# Patient Record
Sex: FEMALE | Race: ASIAN | NOT HISPANIC OR LATINO | Employment: UNEMPLOYED | URBAN - METROPOLITAN AREA
[De-identification: names, ages, dates, MRNs, and addresses within clinical notes are randomized per-mention and may not be internally consistent; named-entity substitution may affect disease eponyms.]

---

## 2022-05-03 ENCOUNTER — TELEPHONE (OUTPATIENT)
Dept: OBGYN | Age: 37
End: 2022-05-03

## 2022-05-04 ENCOUNTER — APPOINTMENT (OUTPATIENT)
Dept: OBGYN | Age: 37
End: 2022-05-04

## 2022-05-09 ENCOUNTER — TELEPHONIC VISIT (OUTPATIENT)
Dept: OBGYN | Age: 37
End: 2022-05-09

## 2022-05-09 DIAGNOSIS — Z34.90 PREGNANCY, UNSPECIFIED GESTATIONAL AGE: Primary | ICD-10-CM

## 2022-05-09 PROCEDURE — X1094 NO CHARGE VISIT: HCPCS | Performed by: OBSTETRICS & GYNECOLOGY

## 2022-05-19 ENCOUNTER — LAB SERVICES (OUTPATIENT)
Dept: LAB | Age: 37
End: 2022-05-19

## 2022-05-19 ENCOUNTER — FIRST OB VISIT (OUTPATIENT)
Dept: OBGYN | Age: 37
End: 2022-05-19

## 2022-05-19 VITALS
TEMPERATURE: 99.3 F | HEIGHT: 65 IN | RESPIRATION RATE: 14 BRPM | BODY MASS INDEX: 24.56 KG/M2 | WEIGHT: 147.4 LBS | DIASTOLIC BLOOD PRESSURE: 66 MMHG | HEART RATE: 108 BPM | SYSTOLIC BLOOD PRESSURE: 95 MMHG

## 2022-05-19 DIAGNOSIS — Z34.90 PREGNANCY, UNSPECIFIED GESTATIONAL AGE: Primary | ICD-10-CM

## 2022-05-19 DIAGNOSIS — Z34.90 PREGNANCY, UNSPECIFIED GESTATIONAL AGE: ICD-10-CM

## 2022-05-19 DIAGNOSIS — O09.529 ANTEPARTUM MULTIGRAVIDA OF ADVANCED MATERNAL AGE: ICD-10-CM

## 2022-05-19 PROBLEM — O09.511 PRIMIGRAVIDA OF ADVANCED MATERNAL AGE IN FIRST TRIMESTER: Status: ACTIVE | Noted: 2022-05-19

## 2022-05-19 LAB
ABO + RH BLD: NORMAL
APPEARANCE UR: CLEAR
APPEARANCE, POC: CLEAR
B-HCG UR QL: POSITIVE
BASOPHILS # BLD: 0 K/MCL (ref 0–0.3)
BASOPHILS NFR BLD: 0 %
BILIRUB UR QL STRIP: NEGATIVE
BILIRUBIN, POC: NEGATIVE
BLD GP AB SCN SERPL QL GEL: NEGATIVE
COLOR UR: ABNORMAL
COLOR, POC: YELLOW
DEPRECATED RDW RBC: 41 FL (ref 39–50)
EOSINOPHIL # BLD: 0 K/MCL (ref 0–0.5)
EOSINOPHIL NFR BLD: 0 %
ERYTHROCYTE [DISTWIDTH] IN BLOOD: 12.1 % (ref 11–15)
GLUCOSE UR STRIP-MCNC: NEGATIVE MG/DL
GLUCOSE UR-MCNC: NEGATIVE MG/DL
HBV SURFACE AG SER QL: NEGATIVE
HCT VFR BLD CALC: 38.8 % (ref 36–46.5)
HCV AB SER QL: NEGATIVE
HGB BLD-MCNC: 13.3 G/DL (ref 12–15.5)
HGB UR QL STRIP: NEGATIVE
HIV 1+2 AB+HIV1 P24 AG SERPL QL IA: NONREACTIVE
IMM GRANULOCYTES # BLD AUTO: 0 K/MCL (ref 0–0.2)
IMM GRANULOCYTES # BLD: 0 %
INTERNAL PROCEDURAL CONTROLS ACCEPTABLE: YES
KETONES UR STRIP-MCNC: NEGATIVE MG/DL
KETONES, POC: NEGATIVE MG/DL
LEUKOCYTE ESTERASE UR QL STRIP: NEGATIVE
LYMPHOCYTES # BLD: 1.5 K/MCL (ref 1–4.8)
LYMPHOCYTES NFR BLD: 15 %
MCH RBC QN AUTO: 31.7 PG (ref 26–34)
MCHC RBC AUTO-ENTMCNC: 34.3 G/DL (ref 32–36.5)
MCV RBC AUTO: 92.4 FL (ref 78–100)
MONOCYTES # BLD: 0.5 K/MCL (ref 0.3–0.9)
MONOCYTES NFR BLD: 5 %
NEUTROPHILS # BLD: 7.8 K/MCL (ref 1.8–7.7)
NEUTROPHILS NFR BLD: 80 %
NITRITE UR QL STRIP: NEGATIVE
NITRITE, POC: NEGATIVE
NRBC BLD MANUAL-RTO: 0 /100 WBC
OCCULT BLOOD, POC: NEGATIVE
PH UR STRIP: 7 [PH] (ref 5–7)
PH UR: 5.5 [PH] (ref 5–7)
PLATELET # BLD AUTO: 175 K/MCL (ref 140–450)
PROT UR STRIP-MCNC: NEGATIVE MG/DL
PROT UR-MCNC: NEGATIVE MG/DL
RBC # BLD: 4.2 MIL/MCL (ref 4–5.2)
RUBV IGG SERPL IA-ACNC: 278.5 UNITS/ML
SP GR UR STRIP: <1.005 (ref 1–1.03)
SP GR UR: 1 (ref 1–1.03)
UROBILINOGEN UR STRIP-MCNC: 0.2 MG/DL
UROBILINOGEN UR-MCNC: 0.2 MG/DL (ref 0–1)
WBC # BLD: 10 K/MCL (ref 4.2–11)
WBC (LEUKOCYTE) ESTERASE, POC: NEGATIVE

## 2022-05-19 PROCEDURE — 0502F SUBSEQUENT PRENATAL CARE: CPT | Performed by: OBSTETRICS & GYNECOLOGY

## 2022-05-19 PROCEDURE — 76801 OB US < 14 WKS SINGLE FETUS: CPT | Performed by: OBSTETRICS & GYNECOLOGY

## 2022-05-19 PROCEDURE — 86787 VARICELLA-ZOSTER ANTIBODY: CPT | Performed by: INTERNAL MEDICINE

## 2022-05-19 PROCEDURE — 81002 URINALYSIS NONAUTO W/O SCOPE: CPT | Performed by: OBSTETRICS & GYNECOLOGY

## 2022-05-19 PROCEDURE — 87491 CHLMYD TRACH DNA AMP PROBE: CPT | Performed by: INTERNAL MEDICINE

## 2022-05-19 PROCEDURE — 81003 URINALYSIS AUTO W/O SCOPE: CPT | Performed by: INTERNAL MEDICINE

## 2022-05-19 PROCEDURE — 36415 COLL VENOUS BLD VENIPUNCTURE: CPT | Performed by: INTERNAL MEDICINE

## 2022-05-19 PROCEDURE — 83021 HEMOGLOBIN CHROMOTOGRAPHY: CPT | Performed by: INTERNAL MEDICINE

## 2022-05-19 PROCEDURE — 81025 URINE PREGNANCY TEST: CPT | Performed by: OBSTETRICS & GYNECOLOGY

## 2022-05-19 PROCEDURE — 86803 HEPATITIS C AB TEST: CPT | Performed by: INTERNAL MEDICINE

## 2022-05-19 PROCEDURE — 80081 OBSTETRIC PANEL INC HIV TSTG: CPT | Performed by: INTERNAL MEDICINE

## 2022-05-19 PROCEDURE — 87591 N.GONORRHOEAE DNA AMP PROB: CPT | Performed by: INTERNAL MEDICINE

## 2022-05-19 PROCEDURE — 83020 HEMOGLOBIN ELECTROPHORESIS: CPT | Performed by: INTERNAL MEDICINE

## 2022-05-19 PROCEDURE — 87086 URINE CULTURE/COLONY COUNT: CPT | Performed by: INTERNAL MEDICINE

## 2022-05-19 ASSESSMENT — EDINBURGH POSTNATAL DEPRESSION SCALE (EPDS)
I HAVE FELT SAD OR MISERABLE: NOT VERY OFTEN
THE THOUGHT OF HARMING MYSELF HAS OCCURRED TO ME: NEVER
I HAVE BEEN SO UNHAPPY THAT I HAVE HAD DIFFICULTY SLEEPING: YES, SOMETIMES
I HAVE BEEN ANXIOUS OR WORRIED FOR NO GOOD REASON: HARDLY EVER
TOTAL SCORE: 7
I HAVE BEEN SO UNHAPPY THAT I HAVE BEEN CRYING: NO, NEVER
THINGS HAVE BEEN GETTING ON TOP OF ME: NO, I HAVE BEEN COPING AS WELL AS EVER
I HAVE LOOKED FORWARD WITH ENJOYMENT TO THINGS: AS MUCH AS I EVER DID
I HAVE BEEN ABLE TO LAUGH AND SEE THE FUNNY SIDE OF THINGS: DEFINITELY NOT SO MUCH NOW
I HAVE FELT SCARED OR PANICKY FOR NO GOOD REASON: NO, NOT AT ALL
I HAVE BLAMED MYSELF UNNECESSARILY WHEN THINGS WENT WRONG: NOT VERY OFTEN

## 2022-05-19 ASSESSMENT — LIFESTYLE VARIABLES
DID ANY OF YOUR PARENTS HAVE PROBLEMS WITH ALCOHOL OR DRUG USE?: NO
DO ANY OF YOUR FRIENDS (PEERS) HAVE PROBLEMS WITH ALCOHOL OR DRUG USE?: NO
DOES YOUR PARTNER HAVE A PROBLEM WITH ALCOHOL OR DRUG USE?: NO
IN THE PAST MONTH, DID YOU DRINK BEER, WINE OR LIQUOR, OR USE OTHER DRUGS? (PREGNANCY): NO
BEFORE YOU WERE PREGNANT DID YOU HAVE A PROBLEM WITH ALCOHOL OR DRUG USE? (PAST): NO

## 2022-05-19 ASSESSMENT — PAIN SCALES - GENERAL: PAINLEVEL: 0

## 2022-05-20 ENCOUNTER — EXTERNAL RECORD (OUTPATIENT)
Dept: HEALTH INFORMATION MANAGEMENT | Facility: OTHER | Age: 37
End: 2022-05-20

## 2022-05-20 LAB
C TRACH RRNA UR QL NAA+PROBE: NEGATIVE
HGB A1 MFR BLD ELPH: 67.3 % (ref 96.4–98.2)
HGB A2 MFR BLD ELPH: 2 % (ref 1.8–3.4)
HGB F MFR BLD ELPH: 1.6 % (ref 0–2)
HGB FRACT BLD ELPH-IMP: ABNORMAL
HGB OTHER MFR BLD ELPH: 29.1 %
Lab: NORMAL
N GONORRHOEA RRNA UR QL NAA+PROBE: NEGATIVE
RPR SER QL: NONREACTIVE
VZV IGG SER IA-ACNC: NORMAL

## 2022-05-21 LAB — BACTERIA UR CULT: NORMAL

## 2022-05-24 DIAGNOSIS — O09.511 PRIMIGRAVIDA OF ADVANCED MATERNAL AGE IN FIRST TRIMESTER: Primary | ICD-10-CM

## 2022-06-03 ENCOUNTER — TELEPHONE (OUTPATIENT)
Dept: OBGYN | Age: 37
End: 2022-06-03

## 2022-06-03 DIAGNOSIS — Z34.90 PREGNANCY, UNSPECIFIED GESTATIONAL AGE: Primary | ICD-10-CM

## 2022-06-05 LAB — SERVICE CMNT-IMP: NORMAL

## 2022-06-08 ENCOUNTER — TELEPHONE (OUTPATIENT)
Dept: HEMATOLOGY/ONCOLOGY | Age: 37
End: 2022-06-08

## 2022-06-13 ENCOUNTER — TELEPHONE (OUTPATIENT)
Dept: HEMATOLOGY/ONCOLOGY | Age: 37
End: 2022-06-13

## 2022-06-13 ENCOUNTER — OFFICE VISIT (OUTPATIENT)
Dept: MATERNAL FETAL MEDICINE | Age: 37
End: 2022-06-13
Attending: OBSTETRICS & GYNECOLOGY

## 2022-06-13 ENCOUNTER — LAB SERVICES (OUTPATIENT)
Dept: LAB | Age: 37
End: 2022-06-13

## 2022-06-13 DIAGNOSIS — Z36.9 FIRST TRIMESTER SCREENING: Primary | ICD-10-CM

## 2022-06-13 DIAGNOSIS — O09.511 PRIMIGRAVIDA OF ADVANCED MATERNAL AGE IN FIRST TRIMESTER: Primary | ICD-10-CM

## 2022-06-13 LAB — BKR KIT TESTING DISCLAIMER STATEMENT: NORMAL

## 2022-06-13 PROCEDURE — 96040 HB GENETIC COUNSELING EACH 30 MIN: CPT | Performed by: GENETIC COUNSELOR, MS

## 2022-06-13 PROCEDURE — 76801 OB US < 14 WKS SINGLE FETUS: CPT | Performed by: OBSTETRICS & GYNECOLOGY

## 2022-06-15 ENCOUNTER — OFFICE VISIT (OUTPATIENT)
Dept: HEMATOLOGY/ONCOLOGY | Age: 37
End: 2022-06-15
Attending: INTERNAL MEDICINE

## 2022-06-15 VITALS
WEIGHT: 153.88 LBS | HEART RATE: 87 BPM | TEMPERATURE: 97.2 F | HEIGHT: 65 IN | OXYGEN SATURATION: 97 % | SYSTOLIC BLOOD PRESSURE: 104 MMHG | BODY MASS INDEX: 25.64 KG/M2 | DIASTOLIC BLOOD PRESSURE: 71 MMHG | RESPIRATION RATE: 16 BRPM

## 2022-06-15 DIAGNOSIS — Z34.90 PREGNANCY, UNSPECIFIED GESTATIONAL AGE: ICD-10-CM

## 2022-06-15 PROCEDURE — 99211 OFF/OP EST MAY X REQ PHY/QHP: CPT

## 2022-06-15 PROCEDURE — 99244 OFF/OP CNSLTJ NEW/EST MOD 40: CPT | Performed by: INTERNAL MEDICINE

## 2022-06-15 PROCEDURE — 99202 OFFICE O/P NEW SF 15 MIN: CPT

## 2022-06-15 ASSESSMENT — ENCOUNTER SYMPTOMS
HEMATOLOGIC/LYMPHATIC NEGATIVE: 1
HEADACHES: 1
FATIGUE: 1
EYES NEGATIVE: 1
ENDOCRINE NEGATIVE: 1
NAUSEA: 1
PSYCHIATRIC NEGATIVE: 1
RESPIRATORY NEGATIVE: 1

## 2022-06-15 ASSESSMENT — PAIN SCALES - GENERAL: PAINLEVEL: 0

## 2022-06-17 ENCOUNTER — OB CHECK (OUTPATIENT)
Dept: OBGYN | Age: 37
End: 2022-06-17

## 2022-06-17 VITALS
TEMPERATURE: 98.5 F | RESPIRATION RATE: 18 BRPM | SYSTOLIC BLOOD PRESSURE: 121 MMHG | HEIGHT: 65 IN | WEIGHT: 144.2 LBS | DIASTOLIC BLOOD PRESSURE: 76 MMHG | BODY MASS INDEX: 24.03 KG/M2 | HEART RATE: 110 BPM

## 2022-06-17 DIAGNOSIS — Z3A.15 15 WEEKS GESTATION OF PREGNANCY: ICD-10-CM

## 2022-06-17 DIAGNOSIS — Z3A.14 14 WEEKS GESTATION OF PREGNANCY: Primary | ICD-10-CM

## 2022-06-17 DIAGNOSIS — O09.511 PRIMIGRAVIDA OF ADVANCED MATERNAL AGE IN FIRST TRIMESTER: ICD-10-CM

## 2022-06-17 LAB
APPEARANCE, POC: CLEAR
BILIRUBIN, POC: NEGATIVE
COLOR, POC: YELLOW
GLUCOSE UR-MCNC: NEGATIVE MG/DL
KETONES, POC: NEGATIVE MG/DL
NITRITE, POC: NEGATIVE
OCCULT BLOOD, POC: NEGATIVE
PH UR: 6 [PH] (ref 5–7)
PROT UR-MCNC: NEGATIVE MG/DL
SP GR UR: 1 (ref 1–1.03)
UROBILINOGEN UR-MCNC: 0.2 MG/DL (ref 0–1)
WBC (LEUKOCYTE) ESTERASE, POC: NEGATIVE

## 2022-06-17 PROCEDURE — 0502F SUBSEQUENT PRENATAL CARE: CPT | Performed by: OBSTETRICS & GYNECOLOGY

## 2022-06-17 PROCEDURE — 81003 URINALYSIS AUTO W/O SCOPE: CPT | Performed by: OBSTETRICS & GYNECOLOGY

## 2022-06-17 ASSESSMENT — PAIN SCALES - GENERAL: PAINLEVEL: 0

## 2022-06-21 ENCOUNTER — TELEPHONE (OUTPATIENT)
Dept: MATERNAL FETAL MEDICINE | Age: 37
End: 2022-06-21

## 2022-06-25 ENCOUNTER — LAB SERVICES (OUTPATIENT)
Dept: LAB | Age: 37
End: 2022-06-25

## 2022-06-25 PROCEDURE — 36415 COLL VENOUS BLD VENIPUNCTURE: CPT | Performed by: INTERNAL MEDICINE

## 2022-06-25 PROCEDURE — 82105 ALPHA-FETOPROTEIN SERUM: CPT | Performed by: INTERNAL MEDICINE

## 2022-06-27 ENCOUNTER — TELEPHONE (OUTPATIENT)
Dept: HEMATOLOGY/ONCOLOGY | Age: 37
End: 2022-06-27

## 2022-06-27 LAB
# FETUSES US: NORMAL
AFP MOM SERPL: 0.7 MOM
AFP SERPL-MCNC: 25.2 NG/ML
AGE AT DELIVERY: 36.28
GA: NORMAL WK
HX OF TRISOMY 21 NARR: NO
IDDM PATIENT QL: NO
METHOD BEST OVERALL GA ESTIMATE: NORMAL
NEURAL TUBE DEFECT RISK FETUS: NORMAL %
SERVICE CMNT-IMP: NORMAL
SERVICE CMNT-IMP: NORMAL
TS 21 RISK FETUS: NORMAL

## 2022-07-15 ENCOUNTER — OB CHECK (OUTPATIENT)
Dept: OBGYN | Age: 37
End: 2022-07-15

## 2022-07-15 VITALS
WEIGHT: 149.8 LBS | RESPIRATION RATE: 18 BRPM | HEIGHT: 65 IN | TEMPERATURE: 97.1 F | BODY MASS INDEX: 24.96 KG/M2 | SYSTOLIC BLOOD PRESSURE: 104 MMHG | HEART RATE: 73 BPM | DIASTOLIC BLOOD PRESSURE: 66 MMHG

## 2022-07-15 DIAGNOSIS — Z3A.19 19 WEEKS GESTATION OF PREGNANCY: Primary | ICD-10-CM

## 2022-07-15 DIAGNOSIS — O09.511 PRIMIGRAVIDA OF ADVANCED MATERNAL AGE IN FIRST TRIMESTER: ICD-10-CM

## 2022-07-15 LAB
APPEARANCE, POC: CLEAR
BILIRUBIN, POC: NEGATIVE
COLOR, POC: YELLOW
GLUCOSE UR-MCNC: NEGATIVE MG/DL
KETONES, POC: NEGATIVE MG/DL
NITRITE, POC: NEGATIVE
OCCULT BLOOD, POC: NEGATIVE
PH UR: 6 [PH] (ref 5–7)
PROT UR-MCNC: NEGATIVE MG/DL
SP GR UR: 1.01 (ref 1–1.03)
UROBILINOGEN UR-MCNC: 0.2 MG/DL (ref 0–1)
WBC (LEUKOCYTE) ESTERASE, POC: NEGATIVE

## 2022-07-15 PROCEDURE — 0502F SUBSEQUENT PRENATAL CARE: CPT | Performed by: OBSTETRICS & GYNECOLOGY

## 2022-07-15 ASSESSMENT — PAIN SCALES - GENERAL: PAINLEVEL: 0

## 2022-07-25 ENCOUNTER — OFFICE VISIT (OUTPATIENT)
Dept: MATERNAL FETAL MEDICINE | Age: 37
End: 2022-07-25
Attending: OBSTETRICS & GYNECOLOGY

## 2022-07-25 DIAGNOSIS — O09.511 PRIMIGRAVIDA OF ADVANCED MATERNAL AGE IN FIRST TRIMESTER: Primary | ICD-10-CM

## 2022-07-25 DIAGNOSIS — Z36.89 ENCOUNTER FOR FETAL ANATOMIC SURVEY: ICD-10-CM

## 2022-07-25 PROCEDURE — 76817 TRANSVAGINAL US OBSTETRIC: CPT | Performed by: OBSTETRICS & GYNECOLOGY

## 2022-07-25 PROCEDURE — 76811 OB US DETAILED SNGL FETUS: CPT | Performed by: OBSTETRICS & GYNECOLOGY

## 2022-07-27 ENCOUNTER — TELEPHONE (OUTPATIENT)
Dept: OBGYN | Age: 37
End: 2022-07-27

## 2022-07-27 DIAGNOSIS — Z34.90 PREGNANCY, UNSPECIFIED GESTATIONAL AGE: Primary | ICD-10-CM

## 2022-08-11 ENCOUNTER — V-VISIT (OUTPATIENT)
Dept: MATERNAL FETAL MEDICINE | Age: 37
End: 2022-08-11
Attending: OBSTETRICS & GYNECOLOGY

## 2022-08-11 DIAGNOSIS — O09.512 PRIMIGRAVIDA OF ADVANCED MATERNAL AGE IN SECOND TRIMESTER: Primary | ICD-10-CM

## 2022-08-11 PROCEDURE — 76816 OB US FOLLOW-UP PER FETUS: CPT | Performed by: OBSTETRICS & GYNECOLOGY

## 2022-08-12 ENCOUNTER — OB CHECK (OUTPATIENT)
Dept: OBGYN | Age: 37
End: 2022-08-12

## 2022-08-12 VITALS
HEART RATE: 75 BPM | TEMPERATURE: 98.6 F | RESPIRATION RATE: 18 BRPM | BODY MASS INDEX: 26.16 KG/M2 | SYSTOLIC BLOOD PRESSURE: 98 MMHG | HEIGHT: 65 IN | WEIGHT: 157 LBS | DIASTOLIC BLOOD PRESSURE: 64 MMHG

## 2022-08-12 DIAGNOSIS — Z34.90 PREGNANCY, UNSPECIFIED GESTATIONAL AGE: ICD-10-CM

## 2022-08-12 DIAGNOSIS — O09.511 PRIMIGRAVIDA OF ADVANCED MATERNAL AGE IN FIRST TRIMESTER: Primary | ICD-10-CM

## 2022-08-12 DIAGNOSIS — Z3A.23 23 WEEKS GESTATION OF PREGNANCY: ICD-10-CM

## 2022-08-12 LAB
APPEARANCE, POC: CLEAR
BILIRUBIN, POC: NEGATIVE
COLOR, POC: COLORLESS
GLUCOSE UR-MCNC: NEGATIVE MG/DL
KETONES, POC: NEGATIVE MG/DL
NITRITE, POC: NEGATIVE
OCCULT BLOOD, POC: ABNORMAL
PH UR: 6 [PH] (ref 5–7)
PROT UR-MCNC: NEGATIVE MG/DL
SP GR UR: 1.02 (ref 1–1.03)
UROBILINOGEN UR-MCNC: 0.2 MG/DL (ref 0–1)
WBC (LEUKOCYTE) ESTERASE, POC: NEGATIVE

## 2022-08-12 PROCEDURE — 0502F SUBSEQUENT PRENATAL CARE: CPT | Performed by: OBSTETRICS & GYNECOLOGY

## 2022-08-12 ASSESSMENT — PAIN SCALES - GENERAL
PAINLEVEL: 0
PAINLEVEL: 0

## 2022-09-08 ENCOUNTER — LAB SERVICES (OUTPATIENT)
Dept: LAB | Age: 37
End: 2022-09-08

## 2022-09-08 DIAGNOSIS — Z34.90 PREGNANCY, UNSPECIFIED GESTATIONAL AGE: Primary | ICD-10-CM

## 2022-09-08 DIAGNOSIS — O09.511 PRIMIGRAVIDA OF ADVANCED MATERNAL AGE IN FIRST TRIMESTER: ICD-10-CM

## 2022-09-08 DIAGNOSIS — Z3A.23 23 WEEKS GESTATION OF PREGNANCY: ICD-10-CM

## 2022-09-08 LAB
BASOPHILS # BLD: 0 K/MCL (ref 0–0.3)
BASOPHILS NFR BLD: 0 %
DEPRECATED RDW RBC: 48.7 FL (ref 39–50)
EOSINOPHIL # BLD: 0.1 K/MCL (ref 0–0.5)
EOSINOPHIL NFR BLD: 1 %
ERYTHROCYTE [DISTWIDTH] IN BLOOD: 13.6 % (ref 11–15)
GLUCOSE 1H P 50 G GLC PO SERPL-MCNC: 153 MG/DL (ref 65–139)
HCT VFR BLD CALC: 34.3 % (ref 36–46.5)
HGB BLD-MCNC: 11.1 G/DL (ref 12–15.5)
HIV 1+2 AB+HIV1 P24 AG SERPL QL IA: NONREACTIVE
IMM GRANULOCYTES # BLD AUTO: 0 K/MCL (ref 0–0.2)
IMM GRANULOCYTES # BLD: 1 %
LYMPHOCYTES # BLD: 1.1 K/MCL (ref 1–4.8)
LYMPHOCYTES NFR BLD: 15 %
MCH RBC QN AUTO: 31.9 PG (ref 26–34)
MCHC RBC AUTO-ENTMCNC: 32.4 G/DL (ref 32–36.5)
MCV RBC AUTO: 98.6 FL (ref 78–100)
MONOCYTES # BLD: 0.3 K/MCL (ref 0.3–0.9)
MONOCYTES NFR BLD: 4 %
NEUTROPHILS # BLD: 6 K/MCL (ref 1.8–7.7)
NEUTROPHILS NFR BLD: 79 %
NRBC BLD MANUAL-RTO: 0 /100 WBC
PLATELET # BLD AUTO: 165 K/MCL (ref 140–450)
RBC # BLD: 3.48 MIL/MCL (ref 4–5.2)
WBC # BLD: 7.6 K/MCL (ref 4.2–11)

## 2022-09-08 PROCEDURE — 82950 GLUCOSE TEST: CPT | Performed by: INTERNAL MEDICINE

## 2022-09-08 PROCEDURE — 87389 HIV-1 AG W/HIV-1&-2 AB AG IA: CPT | Performed by: INTERNAL MEDICINE

## 2022-09-08 PROCEDURE — 86592 SYPHILIS TEST NON-TREP QUAL: CPT | Performed by: INTERNAL MEDICINE

## 2022-09-08 PROCEDURE — 85025 COMPLETE CBC W/AUTO DIFF WBC: CPT | Performed by: INTERNAL MEDICINE

## 2022-09-08 PROCEDURE — 36415 COLL VENOUS BLD VENIPUNCTURE: CPT | Performed by: INTERNAL MEDICINE

## 2022-09-09 ENCOUNTER — OB CHECK (OUTPATIENT)
Dept: OBGYN | Age: 37
End: 2022-09-09

## 2022-09-09 VITALS
WEIGHT: 164 LBS | SYSTOLIC BLOOD PRESSURE: 104 MMHG | HEART RATE: 76 BPM | TEMPERATURE: 97.4 F | BODY MASS INDEX: 27.29 KG/M2 | DIASTOLIC BLOOD PRESSURE: 71 MMHG

## 2022-09-09 DIAGNOSIS — O09.511 PRIMIGRAVIDA OF ADVANCED MATERNAL AGE IN FIRST TRIMESTER: ICD-10-CM

## 2022-09-09 DIAGNOSIS — Z3A.27 27 WEEKS GESTATION OF PREGNANCY: Primary | ICD-10-CM

## 2022-09-09 DIAGNOSIS — Z23 NEEDS FLU SHOT: ICD-10-CM

## 2022-09-09 LAB
APPEARANCE, POC: CLEAR
BILIRUBIN, POC: NEGATIVE
COLOR, POC: YELLOW
GLUCOSE UR-MCNC: NEGATIVE MG/DL
KETONES, POC: NEGATIVE MG/DL
NITRITE, POC: NEGATIVE
OCCULT BLOOD, POC: NEGATIVE
PH UR: 6.5 [PH] (ref 5–7)
PROT UR-MCNC: NEGATIVE MG/DL
RPR SER QL: NONREACTIVE
SP GR UR: 1 (ref 1–1.03)
UROBILINOGEN UR-MCNC: 0.2 MG/DL (ref 0–1)
WBC (LEUKOCYTE) ESTERASE, POC: NEGATIVE

## 2022-09-09 PROCEDURE — 90686 IIV4 VACC NO PRSV 0.5 ML IM: CPT | Performed by: OBSTETRICS & GYNECOLOGY

## 2022-09-09 PROCEDURE — 90471 IMMUNIZATION ADMIN: CPT | Performed by: OBSTETRICS & GYNECOLOGY

## 2022-09-09 PROCEDURE — 0502F SUBSEQUENT PRENATAL CARE: CPT | Performed by: OBSTETRICS & GYNECOLOGY

## 2022-09-09 ASSESSMENT — PAIN SCALES - GENERAL: PAINLEVEL: 0

## 2022-09-17 ENCOUNTER — LAB SERVICES (OUTPATIENT)
Dept: LAB | Age: 37
End: 2022-09-17

## 2022-09-17 DIAGNOSIS — Z34.90 PREGNANCY, UNSPECIFIED GESTATIONAL AGE: ICD-10-CM

## 2022-09-17 LAB
FASTING DURATION TIME PATIENT: 12 HOURS (ref 0–999)
GLUCOSE 1H P 100 G GLC PO SERPL-MCNC: 157 MG/DL (ref 65–179)
GLUCOSE 2H P 100 G GLC PO SERPL-MCNC: 114 MG/DL (ref 65–154)
GLUCOSE 3H P 100 G GLC PO SERPL-MCNC: 70 MG/DL (ref 65–139)
GLUCOSE P FAST SERPL-MCNC: 80 MG/DL (ref 65–94)

## 2022-09-17 PROCEDURE — 36415 COLL VENOUS BLD VENIPUNCTURE: CPT | Performed by: INTERNAL MEDICINE

## 2022-09-17 PROCEDURE — 82951 GLUCOSE TOLERANCE TEST (GTT): CPT | Performed by: INTERNAL MEDICINE

## 2022-09-17 PROCEDURE — 82952 GTT-ADDED SAMPLES: CPT | Performed by: INTERNAL MEDICINE

## 2022-09-23 ENCOUNTER — OB CHECK (OUTPATIENT)
Dept: OBGYN | Age: 37
End: 2022-09-23

## 2022-09-23 VITALS
DIASTOLIC BLOOD PRESSURE: 74 MMHG | HEIGHT: 65 IN | HEART RATE: 79 BPM | WEIGHT: 166.56 LBS | TEMPERATURE: 98.2 F | BODY MASS INDEX: 27.75 KG/M2 | SYSTOLIC BLOOD PRESSURE: 115 MMHG | RESPIRATION RATE: 18 BRPM

## 2022-09-23 DIAGNOSIS — O09.511 PRIMIGRAVIDA OF ADVANCED MATERNAL AGE IN FIRST TRIMESTER: Primary | ICD-10-CM

## 2022-09-23 DIAGNOSIS — Z23 NEED FOR TDAP VACCINATION: ICD-10-CM

## 2022-09-23 DIAGNOSIS — O09.522 MULTIGRAVIDA OF ADVANCED MATERNAL AGE IN SECOND TRIMESTER: ICD-10-CM

## 2022-09-23 PROCEDURE — 90715 TDAP VACCINE 7 YRS/> IM: CPT | Performed by: OBSTETRICS & GYNECOLOGY

## 2022-09-23 PROCEDURE — 90471 IMMUNIZATION ADMIN: CPT | Performed by: OBSTETRICS & GYNECOLOGY

## 2022-09-23 PROCEDURE — 0502F SUBSEQUENT PRENATAL CARE: CPT | Performed by: OBSTETRICS & GYNECOLOGY

## 2022-09-23 ASSESSMENT — PAIN SCALES - GENERAL: PAINLEVEL: 0

## 2022-10-07 ENCOUNTER — OB CHECK (OUTPATIENT)
Dept: OBGYN | Age: 37
End: 2022-10-07

## 2022-10-07 VITALS
HEART RATE: 80 BPM | HEIGHT: 65 IN | RESPIRATION RATE: 18 BRPM | TEMPERATURE: 98.1 F | BODY MASS INDEX: 28.4 KG/M2 | WEIGHT: 170.45 LBS | SYSTOLIC BLOOD PRESSURE: 127 MMHG | DIASTOLIC BLOOD PRESSURE: 75 MMHG

## 2022-10-07 DIAGNOSIS — O09.511 PRIMIGRAVIDA OF ADVANCED MATERNAL AGE IN FIRST TRIMESTER: Primary | ICD-10-CM

## 2022-10-07 DIAGNOSIS — Z3A.31 31 WEEKS GESTATION OF PREGNANCY: ICD-10-CM

## 2022-10-07 LAB
BILIRUBIN, POC: NEGATIVE
GLUCOSE UR-MCNC: NEGATIVE MG/DL
KETONES, POC: NEGATIVE MG/DL
NITRITE, POC: NEGATIVE
OCCULT BLOOD, POC: NEGATIVE
PH UR: 7 [PH] (ref 5–7)
PROT UR-MCNC: NEGATIVE MG/DL
SP GR UR: 1.01 (ref 1–1.03)
UROBILINOGEN UR-MCNC: 0.2 MG/DL (ref 0–1)
WBC (LEUKOCYTE) ESTERASE, POC: NEGATIVE

## 2022-10-07 PROCEDURE — 0502F SUBSEQUENT PRENATAL CARE: CPT | Performed by: OBSTETRICS & GYNECOLOGY

## 2022-10-07 ASSESSMENT — PAIN SCALES - GENERAL: PAINLEVEL: 0

## 2022-10-17 ENCOUNTER — OFFICE VISIT (OUTPATIENT)
Dept: MATERNAL FETAL MEDICINE | Age: 37
End: 2022-10-17
Attending: OBSTETRICS & GYNECOLOGY

## 2022-10-17 DIAGNOSIS — O09.513 PRIMIGRAVIDA OF ADVANCED MATERNAL AGE IN THIRD TRIMESTER: ICD-10-CM

## 2022-10-17 DIAGNOSIS — Z3A.32 32 WEEKS GESTATION OF PREGNANCY: Primary | ICD-10-CM

## 2022-10-17 PROCEDURE — 76816 OB US FOLLOW-UP PER FETUS: CPT | Performed by: OBSTETRICS & GYNECOLOGY

## 2022-10-21 ENCOUNTER — OB CHECK (OUTPATIENT)
Dept: OBGYN | Age: 37
End: 2022-10-21

## 2022-10-21 VITALS
SYSTOLIC BLOOD PRESSURE: 119 MMHG | RESPIRATION RATE: 18 BRPM | BODY MASS INDEX: 28.69 KG/M2 | HEART RATE: 83 BPM | TEMPERATURE: 98.1 F | DIASTOLIC BLOOD PRESSURE: 73 MMHG | WEIGHT: 172.2 LBS | HEIGHT: 65 IN

## 2022-10-21 DIAGNOSIS — O09.511 PRIMIGRAVIDA OF ADVANCED MATERNAL AGE IN FIRST TRIMESTER: ICD-10-CM

## 2022-10-21 DIAGNOSIS — O09.523 MULTIGRAVIDA OF ADVANCED MATERNAL AGE IN THIRD TRIMESTER: Primary | ICD-10-CM

## 2022-10-21 LAB
APPEARANCE, POC: CLEAR
BILIRUBIN, POC: NEGATIVE
COLOR, POC: COLORLESS
GLUCOSE UR-MCNC: NEGATIVE MG/DL
KETONES, POC: NEGATIVE MG/DL
NITRITE, POC: NEGATIVE
OCCULT BLOOD, POC: NEGATIVE
PH UR: 7 [PH] (ref 5–7)
PROT UR-MCNC: NEGATIVE MG/DL
SP GR UR: 1 (ref 1–1.03)
UROBILINOGEN UR-MCNC: 0.2 MG/DL (ref 0–1)
WBC (LEUKOCYTE) ESTERASE, POC: NEGATIVE

## 2022-10-21 PROCEDURE — 0502F SUBSEQUENT PRENATAL CARE: CPT | Performed by: OBSTETRICS & GYNECOLOGY

## 2022-10-21 RX ORDER — BREAST PUMP
EACH MISCELLANEOUS
Qty: 1 EACH | Refills: 0 | Status: SHIPPED | OUTPATIENT
Start: 2022-10-21

## 2022-10-21 ASSESSMENT — PAIN SCALES - GENERAL: PAINLEVEL: 0

## 2022-10-24 ENCOUNTER — TELEPHONE (OUTPATIENT)
Dept: OBGYN | Age: 37
End: 2022-10-24

## 2022-11-04 ENCOUNTER — OB CHECK (OUTPATIENT)
Dept: OBGYN | Age: 37
End: 2022-11-04

## 2022-11-04 VITALS
DIASTOLIC BLOOD PRESSURE: 66 MMHG | BODY MASS INDEX: 29.44 KG/M2 | HEART RATE: 68 BPM | WEIGHT: 176.92 LBS | SYSTOLIC BLOOD PRESSURE: 100 MMHG | TEMPERATURE: 97.5 F

## 2022-11-04 DIAGNOSIS — Z3A.35 35 WEEKS GESTATION OF PREGNANCY: Primary | ICD-10-CM

## 2022-11-04 LAB
APPEARANCE, POC: CLEAR
BILIRUBIN, POC: NEGATIVE
COLOR, POC: YELLOW
GLUCOSE UR-MCNC: NEGATIVE MG/DL
KETONES, POC: NEGATIVE MG/DL
NITRITE, POC: NEGATIVE
OCCULT BLOOD, POC: ABNORMAL
PH UR: 6 [PH] (ref 5–7)
PROT UR-MCNC: NEGATIVE MG/DL
SP GR UR: 1.02 (ref 1–1.03)
UROBILINOGEN UR-MCNC: 0.2 MG/DL (ref 0–1)
WBC (LEUKOCYTE) ESTERASE, POC: NEGATIVE

## 2022-11-04 PROCEDURE — 87081 CULTURE SCREEN ONLY: CPT | Performed by: INTERNAL MEDICINE

## 2022-11-04 PROCEDURE — 0502F SUBSEQUENT PRENATAL CARE: CPT | Performed by: OBSTETRICS & GYNECOLOGY

## 2022-11-04 ASSESSMENT — PAIN SCALES - GENERAL: PAINLEVEL: 0

## 2022-11-06 LAB — GP B STREP SPEC QL CULT: NORMAL

## 2022-11-09 ENCOUNTER — OFFICE VISIT (OUTPATIENT)
Dept: MATERNAL FETAL MEDICINE | Age: 37
End: 2022-11-09
Attending: OBSTETRICS & GYNECOLOGY

## 2022-11-09 DIAGNOSIS — O09.513 PRIMIGRAVIDA OF ADVANCED MATERNAL AGE IN THIRD TRIMESTER: Primary | ICD-10-CM

## 2022-11-09 PROCEDURE — 76816 OB US FOLLOW-UP PER FETUS: CPT | Performed by: OBSTETRICS & GYNECOLOGY

## 2022-11-11 ENCOUNTER — APPOINTMENT (OUTPATIENT)
Dept: OBGYN | Age: 37
End: 2022-11-11

## 2022-11-11 ENCOUNTER — OB CHECK (OUTPATIENT)
Dept: OBGYN | Age: 37
End: 2022-11-11

## 2022-11-11 VITALS
BODY MASS INDEX: 29.61 KG/M2 | RESPIRATION RATE: 16 BRPM | SYSTOLIC BLOOD PRESSURE: 106 MMHG | HEIGHT: 65 IN | DIASTOLIC BLOOD PRESSURE: 73 MMHG | HEART RATE: 83 BPM | WEIGHT: 177.69 LBS | TEMPERATURE: 98.1 F

## 2022-11-11 DIAGNOSIS — Z3A.36 36 WEEKS GESTATION OF PREGNANCY: Primary | ICD-10-CM

## 2022-11-11 LAB
BILIRUBIN, POC: NEGATIVE
GLUCOSE UR-MCNC: NEGATIVE MG/DL
KETONES, POC: NEGATIVE MG/DL
NITRITE, POC: NEGATIVE
OCCULT BLOOD, POC: NEGATIVE
PH UR: 6 [PH] (ref 5–7)
PROT UR-MCNC: NEGATIVE MG/DL
SP GR UR: 1.01 (ref 1–1.03)
UROBILINOGEN UR-MCNC: 0.2 MG/DL (ref 0–1)
WBC (LEUKOCYTE) ESTERASE, POC: NEGATIVE

## 2022-11-11 PROCEDURE — 0502F SUBSEQUENT PRENATAL CARE: CPT | Performed by: OBSTETRICS & GYNECOLOGY

## 2022-11-11 ASSESSMENT — PAIN SCALES - GENERAL: PAINLEVEL: 0

## 2022-11-18 ENCOUNTER — OB CHECK (OUTPATIENT)
Dept: OBGYN | Age: 37
End: 2022-11-18

## 2022-11-18 VITALS
WEIGHT: 178.1 LBS | HEART RATE: 96 BPM | HEIGHT: 65 IN | TEMPERATURE: 97.1 F | BODY MASS INDEX: 29.67 KG/M2 | DIASTOLIC BLOOD PRESSURE: 72 MMHG | SYSTOLIC BLOOD PRESSURE: 110 MMHG

## 2022-11-18 DIAGNOSIS — Z3A.37 37 WEEKS GESTATION OF PREGNANCY: Primary | ICD-10-CM

## 2022-11-18 LAB
BILIRUBIN, POC: NEGATIVE
GLUCOSE UR-MCNC: NEGATIVE MG/DL
KETONES, POC: NEGATIVE MG/DL
NITRITE, POC: NEGATIVE
OCCULT BLOOD, POC: ABNORMAL
PH UR: 6 [PH] (ref 5–7)
PROT UR-MCNC: NEGATIVE MG/DL
SP GR UR: 1.01 (ref 1–1.03)
UROBILINOGEN UR-MCNC: 0.2 MG/DL (ref 0–1)
WBC (LEUKOCYTE) ESTERASE, POC: NEGATIVE

## 2022-11-18 PROCEDURE — 0502F SUBSEQUENT PRENATAL CARE: CPT | Performed by: OBSTETRICS & GYNECOLOGY

## 2022-11-18 ASSESSMENT — PAIN SCALES - GENERAL: PAINLEVEL: 0

## 2022-11-28 ENCOUNTER — OB CHECK (OUTPATIENT)
Dept: OBGYN | Age: 37
End: 2022-11-28

## 2022-11-28 VITALS
RESPIRATION RATE: 16 BRPM | HEART RATE: 76 BPM | TEMPERATURE: 97.9 F | SYSTOLIC BLOOD PRESSURE: 110 MMHG | BODY MASS INDEX: 30.24 KG/M2 | DIASTOLIC BLOOD PRESSURE: 74 MMHG | WEIGHT: 181.5 LBS | HEIGHT: 65 IN

## 2022-11-28 DIAGNOSIS — Z3A.38 38 WEEKS GESTATION OF PREGNANCY: ICD-10-CM

## 2022-11-28 DIAGNOSIS — Z20.822 CONTACT WITH AND (SUSPECTED) EXPOSURE TO COVID-19: Primary | ICD-10-CM

## 2022-11-28 LAB
BILIRUBIN, POC: NEGATIVE
GLUCOSE UR-MCNC: NEGATIVE MG/DL
KETONES, POC: NEGATIVE MG/DL
NITRITE, POC: NEGATIVE
OCCULT BLOOD, POC: ABNORMAL
PH UR: 6.5 [PH] (ref 5–7)
PROT UR-MCNC: NEGATIVE MG/DL
SP GR UR: 1.01 (ref 1–1.03)
UROBILINOGEN UR-MCNC: 0.2 MG/DL (ref 0–1)
WBC (LEUKOCYTE) ESTERASE, POC: NEGATIVE

## 2022-11-28 PROCEDURE — 59025 FETAL NON-STRESS TEST: CPT | Performed by: OBSTETRICS & GYNECOLOGY

## 2022-11-28 PROCEDURE — 0502F SUBSEQUENT PRENATAL CARE: CPT | Performed by: OBSTETRICS & GYNECOLOGY

## 2022-11-28 ASSESSMENT — PAIN SCALES - GENERAL: PAINLEVEL: 0

## 2022-12-05 ENCOUNTER — OB CHECK (OUTPATIENT)
Dept: OBGYN | Age: 37
End: 2022-12-05

## 2022-12-05 VITALS
BODY MASS INDEX: 30.6 KG/M2 | TEMPERATURE: 98.2 F | RESPIRATION RATE: 16 BRPM | WEIGHT: 183.7 LBS | HEIGHT: 65 IN | SYSTOLIC BLOOD PRESSURE: 119 MMHG | HEART RATE: 106 BPM | DIASTOLIC BLOOD PRESSURE: 78 MMHG

## 2022-12-05 DIAGNOSIS — Z3A.39 39 WEEKS GESTATION OF PREGNANCY: Primary | ICD-10-CM

## 2022-12-05 PROCEDURE — 0502F SUBSEQUENT PRENATAL CARE: CPT | Performed by: OBSTETRICS & GYNECOLOGY

## 2022-12-05 PROCEDURE — 59025 FETAL NON-STRESS TEST: CPT | Performed by: OBSTETRICS & GYNECOLOGY

## 2022-12-05 ASSESSMENT — PAIN SCALES - GENERAL: PAINLEVEL: 0

## 2022-12-06 ENCOUNTER — LAB SERVICES (OUTPATIENT)
Dept: LAB | Age: 37
End: 2022-12-06

## 2022-12-06 DIAGNOSIS — Z20.822 CONTACT WITH AND (SUSPECTED) EXPOSURE TO COVID-19: ICD-10-CM

## 2022-12-06 PROCEDURE — U0005 INFEC AGEN DETEC AMPLI PROBE: HCPCS | Performed by: INTERNAL MEDICINE

## 2022-12-06 PROCEDURE — U0003 INFECTIOUS AGENT DETECTION BY NUCLEIC ACID (DNA OR RNA); SEVERE ACUTE RESPIRATORY SYNDROME CORONAVIRUS 2 (SARS-COV-2) (CORONAVIRUS DISEASE [COVID-19]), AMPLIFIED PROBE TECHNIQUE, MAKING USE OF HIGH THROUGHPUT TECHNOLOGIES AS DESCRIBED BY CMS-2020-01-R: HCPCS | Performed by: INTERNAL MEDICINE

## 2022-12-07 LAB
SARS-COV-2 RNA RESP QL NAA+PROBE: NOT DETECTED
SERVICE CMNT-IMP: NORMAL
SERVICE CMNT-IMP: NORMAL

## 2022-12-08 ENCOUNTER — ANESTHESIA (OUTPATIENT)
Dept: OBGYN | Age: 37
End: 2022-12-08

## 2022-12-08 ENCOUNTER — HOSPITAL ENCOUNTER (INPATIENT)
Age: 37
LOS: 2 days | Discharge: HOME | End: 2022-12-10
Attending: OBSTETRICS & GYNECOLOGY | Admitting: OBSTETRICS & GYNECOLOGY

## 2022-12-08 ENCOUNTER — ANESTHESIA EVENT (OUTPATIENT)
Dept: OBGYN | Age: 37
End: 2022-12-08

## 2022-12-08 ENCOUNTER — APPOINTMENT (OUTPATIENT)
Dept: OBGYN | Age: 37
End: 2022-12-08

## 2022-12-08 PROBLEM — Z34.93 PREGNANT AND NOT YET DELIVERED IN THIRD TRIMESTER: Status: ACTIVE | Noted: 2022-12-08

## 2022-12-08 LAB
ABO + RH BLD: NORMAL
APPEARANCE UR: CLEAR
BASOPHILS # BLD: 0 K/MCL (ref 0–0.3)
BASOPHILS NFR BLD: 0 %
BILIRUB UR QL STRIP: NEGATIVE
BLD GP AB SCN SERPL QL GEL: NEGATIVE
COLOR UR: NORMAL
DEPRECATED RDW RBC: 49.1 FL (ref 39–50)
EOSINOPHIL # BLD: 0 K/MCL (ref 0–0.5)
EOSINOPHIL NFR BLD: 1 %
ERYTHROCYTE [DISTWIDTH] IN BLOOD: 13.7 % (ref 11–15)
GLUCOSE UR STRIP-MCNC: NEGATIVE MG/DL
HCT VFR BLD CALC: 36.3 % (ref 36–46.5)
HGB BLD-MCNC: 12.2 G/DL (ref 12–15.5)
HGB UR QL STRIP: NEGATIVE
IMM GRANULOCYTES # BLD AUTO: 0.1 K/MCL (ref 0–0.2)
IMM GRANULOCYTES # BLD: 1 %
KETONES UR STRIP-MCNC: NEGATIVE MG/DL
LEUKOCYTE ESTERASE UR QL STRIP: NEGATIVE
LYMPHOCYTES # BLD: 1.4 K/MCL (ref 1–4.8)
LYMPHOCYTES NFR BLD: 17 %
MCH RBC QN AUTO: 32.7 PG (ref 26–34)
MCHC RBC AUTO-ENTMCNC: 33.6 G/DL (ref 32–36.5)
MCV RBC AUTO: 97.3 FL (ref 78–100)
MONOCYTES # BLD: 0.5 K/MCL (ref 0.3–0.9)
MONOCYTES NFR BLD: 7 %
NEUTROPHILS # BLD: 5.9 K/MCL (ref 1.8–7.7)
NEUTROPHILS NFR BLD: 74 %
NITRITE UR QL STRIP: NEGATIVE
NRBC BLD MANUAL-RTO: 0 /100 WBC
PH UR STRIP: 6 [PH] (ref 5–7)
PLATELET # BLD AUTO: 147 K/MCL (ref 140–450)
PROT UR STRIP-MCNC: NEGATIVE MG/DL
RBC # BLD: 3.73 MIL/MCL (ref 4–5.2)
SP GR UR STRIP: 1 (ref 1–1.03)
TYPE AND SCREEN EXPIRATION DATE: NORMAL
UROBILINOGEN UR STRIP-MCNC: 0.2 MG/DL
WBC # BLD: 8 K/MCL (ref 4.2–11)

## 2022-12-08 PROCEDURE — 99222 1ST HOSP IP/OBS MODERATE 55: CPT

## 2022-12-08 PROCEDURE — 10002801 HB RX 250 W/O HCPCS

## 2022-12-08 PROCEDURE — 85025 COMPLETE CBC W/AUTO DIFF WBC: CPT

## 2022-12-08 PROCEDURE — 10907ZC DRAINAGE OF AMNIOTIC FLUID, THERAPEUTIC FROM PRODUCTS OF CONCEPTION, VIA NATURAL OR ARTIFICIAL OPENING: ICD-10-PCS | Performed by: OBSTETRICS & GYNECOLOGY

## 2022-12-08 PROCEDURE — 86901 BLOOD TYPING SEROLOGIC RH(D): CPT

## 2022-12-08 PROCEDURE — 10H07YZ INSERTION OF OTHER DEVICE INTO PRODUCTS OF CONCEPTION, VIA NATURAL OR ARTIFICIAL OPENING: ICD-10-PCS | Performed by: OBSTETRICS & GYNECOLOGY

## 2022-12-08 PROCEDURE — 13003286 HB ROOM CHARGE L&D

## 2022-12-08 PROCEDURE — 10002801 HB RX 250 W/O HCPCS: Performed by: STUDENT IN AN ORGANIZED HEALTH CARE EDUCATION/TRAINING PROGRAM

## 2022-12-08 PROCEDURE — 3E033VJ INTRODUCTION OF OTHER HORMONE INTO PERIPHERAL VEIN, PERCUTANEOUS APPROACH: ICD-10-PCS | Performed by: OBSTETRICS & GYNECOLOGY

## 2022-12-08 PROCEDURE — 10002800 HB RX 250 W HCPCS

## 2022-12-08 PROCEDURE — 10002807 HB RX 258

## 2022-12-08 PROCEDURE — 81003 URINALYSIS AUTO W/O SCOPE: CPT

## 2022-12-08 PROCEDURE — 13000012 HB ANESTHESIA SPINAL/EPIDURAL IN L&D

## 2022-12-08 RX ORDER — OXYTOCIN-SODIUM CHLORIDE 0.9% IV SOLN 30 UNIT/500ML 30-0.9/5 UT/ML-%
0-334 SOLUTION INTRAVENOUS CONTINUOUS
Status: DISCONTINUED | OUTPATIENT
Start: 2022-12-08 | End: 2022-12-09 | Stop reason: SDUPTHER

## 2022-12-08 RX ORDER — LIDOCAINE HYDROCHLORIDE AND EPINEPHRINE 15; 5 MG/ML; UG/ML
INJECTION, SOLUTION EPIDURAL
Status: COMPLETED | OUTPATIENT
Start: 2022-12-08 | End: 2022-12-08

## 2022-12-08 RX ORDER — ONDANSETRON 2 MG/ML
4 INJECTION INTRAMUSCULAR; INTRAVENOUS 2 TIMES DAILY PRN
Status: DISCONTINUED | OUTPATIENT
Start: 2022-12-08 | End: 2022-12-10 | Stop reason: HOSPADM

## 2022-12-08 RX ORDER — OXYTOCIN 10 [USP'U]/ML
10 INJECTION, SOLUTION INTRAMUSCULAR; INTRAVENOUS
Status: DISCONTINUED | OUTPATIENT
Start: 2022-12-08 | End: 2022-12-09 | Stop reason: SDUPTHER

## 2022-12-08 RX ORDER — SODIUM CHLORIDE, SODIUM LACTATE, POTASSIUM CHLORIDE, CALCIUM CHLORIDE 600; 310; 30; 20 MG/100ML; MG/100ML; MG/100ML; MG/100ML
INJECTION, SOLUTION INTRAVENOUS CONTINUOUS
Status: DISCONTINUED | OUTPATIENT
Start: 2022-12-08 | End: 2022-12-10 | Stop reason: HOSPADM

## 2022-12-08 RX ORDER — FENTANYL/ROPIVACAINE/NS/PF 2MCG/ML-.1
PLASTIC BAG, INJECTION (ML) EPIDURAL CONTINUOUS
Status: DISCONTINUED | OUTPATIENT
Start: 2022-12-08 | End: 2022-12-10 | Stop reason: HOSPADM

## 2022-12-08 RX ORDER — EPHEDRINE SULFATE/0.9% NACL/PF 50 MG/10ML
5 SYRINGE (ML) INTRAVENOUS
Status: DISCONTINUED | OUTPATIENT
Start: 2022-12-08 | End: 2022-12-10 | Stop reason: HOSPADM

## 2022-12-08 RX ORDER — LIDOCAINE HYDROCHLORIDE 10 MG/ML
30 INJECTION, SOLUTION EPIDURAL; INFILTRATION; INTRACAUDAL; PERINEURAL
Status: COMPLETED | OUTPATIENT
Start: 2022-12-08 | End: 2022-12-08

## 2022-12-08 RX ORDER — EPHEDRINE SULFATE/0.9% NACL/PF 50 MG/10ML
10 SYRINGE (ML) INTRAVENOUS
Status: DISCONTINUED | OUTPATIENT
Start: 2022-12-08 | End: 2022-12-10 | Stop reason: HOSPADM

## 2022-12-08 RX ORDER — OXYTOCIN/0.9 % SODIUM CHLORIDE 30/500 ML
0-25 PLASTIC BAG, INJECTION (ML) INTRAVENOUS CONTINUOUS
Status: DISCONTINUED | OUTPATIENT
Start: 2022-12-08 | End: 2022-12-09 | Stop reason: SDUPTHER

## 2022-12-08 RX ORDER — NALBUPHINE HYDROCHLORIDE 10 MG/ML
2.5 INJECTION, SOLUTION INTRAMUSCULAR; INTRAVENOUS; SUBCUTANEOUS
Status: DISCONTINUED | OUTPATIENT
Start: 2022-12-08 | End: 2022-12-10 | Stop reason: HOSPADM

## 2022-12-08 RX ORDER — NALOXONE HCL 0.4 MG/ML
0.1 VIAL (ML) INJECTION PRN
Status: DISCONTINUED | OUTPATIENT
Start: 2022-12-08 | End: 2022-12-10 | Stop reason: HOSPADM

## 2022-12-08 RX ORDER — 0.9 % SODIUM CHLORIDE 0.9 %
2 VIAL (ML) INJECTION EVERY 12 HOURS SCHEDULED
Status: DISCONTINUED | OUTPATIENT
Start: 2022-12-08 | End: 2022-12-10 | Stop reason: HOSPADM

## 2022-12-08 RX ADMIN — OXYTOCIN-SODIUM CHLORIDE 0.9% IV SOLN 30 UNIT/500ML 2 MILLI-UNITS/MIN: 30-0.9/5 SOLUTION at 06:30

## 2022-12-08 RX ADMIN — SODIUM CHLORIDE, POTASSIUM CHLORIDE, SODIUM LACTATE AND CALCIUM CHLORIDE: 600; 310; 30; 20 INJECTION, SOLUTION INTRAVENOUS at 20:37

## 2022-12-08 RX ADMIN — LIDOCAINE HYDROCHLORIDE 3 ML: 10 INJECTION, SOLUTION EPIDURAL; INFILTRATION; INTRACAUDAL; PERINEURAL at 21:30

## 2022-12-08 RX ADMIN — SODIUM CHLORIDE, POTASSIUM CHLORIDE, SODIUM LACTATE AND CALCIUM CHLORIDE 125 ML/HR: 600; 310; 30; 20 INJECTION, SOLUTION INTRAVENOUS at 06:01

## 2022-12-08 RX ADMIN — SODIUM CHLORIDE, POTASSIUM CHLORIDE, SODIUM LACTATE AND CALCIUM CHLORIDE: 600; 310; 30; 20 INJECTION, SOLUTION INTRAVENOUS at 22:13

## 2022-12-08 RX ADMIN — LIDOCAINE HYDROCHLORIDE,EPINEPHRINE BITARTRATE 6 ML: 15; .005 INJECTION, SOLUTION EPIDURAL; INFILTRATION; INTRACAUDAL; PERINEURAL at 21:50

## 2022-12-08 RX ADMIN — Medication 12 ML/HR: at 21:51

## 2022-12-08 ASSESSMENT — LIFESTYLE VARIABLES
SHORT OF BREATH OR FATIGUE WITH ADLS: NO
RECENTLY LOST WEIGHT WITHOUT TRYING: 0
HOW OFTEN DO YOU HAVE 6 OR MORE DRINKS ON ONE OCCASION: NEVER
LAST DRINK YOU HAD: DENIES INTAKE
ALCOHOL_USE_STATUS: NO OR LOW RISK WITH VALIDATED TOOL
HOW MANY STANDARD DRINKS CONTAINING ALCOHOL DO YOU HAVE ON A TYPICAL DAY: 0,1 OR 2
AUDIT-C TOTAL SCORE: 0
ARE YOU BLIND OR DO YOU HAVE SERIOUS DIFFICULTY SEEING, EVEN WHEN WEARING GLASSES: NO
IS PATIENT ABLE TO COMPLETE ASSESSMENT AT THIS TIME: YES
HISTORY OF PROBLEMS WHEN YOU STOP DRINKING ALCOHOL: NO
CHRONIC/CANCER PAIN PRESENT: NO
ARE YOU DEAF OR DO YOU HAVE SERIOUS DIFFICULTY  HEARING: NO
HAVE YOU BEEN EATING POORLY BECAUSE OF A DECREASED APPETITE: 0
HOW OFTEN DO YOU HAVE A DRINK CONTAINING ALCOHOL: NEVER
ADL BEFORE ADMISSION: INDEPENDENT
ADL NEEDS ASSIST: NO
RECENT DECLINE IN ADLS: NO

## 2022-12-08 ASSESSMENT — PAIN SCALES - GENERAL
PAINLEVEL_OUTOF10: 5
PAINLEVEL_OUTOF10: 0

## 2022-12-08 ASSESSMENT — ACTIVITIES OF DAILY LIVING (ADL): ADL_SCORE: 12

## 2022-12-09 PROCEDURE — 10000003 HB ROOM CHARGE WOMEN'S HEALTH

## 2022-12-09 PROCEDURE — 59400 OBSTETRICAL CARE: CPT | Performed by: STUDENT IN AN ORGANIZED HEALTH CARE EDUCATION/TRAINING PROGRAM

## 2022-12-09 PROCEDURE — 10004651 HB RX, NO CHARGE ITEM: Performed by: STUDENT IN AN ORGANIZED HEALTH CARE EDUCATION/TRAINING PROGRAM

## 2022-12-09 PROCEDURE — 10002803 HB RX 637: Performed by: STUDENT IN AN ORGANIZED HEALTH CARE EDUCATION/TRAINING PROGRAM

## 2022-12-09 PROCEDURE — 10001788 HB DELIVERY VAGINAL

## 2022-12-09 PROCEDURE — 10002801 HB RX 250 W/O HCPCS: Performed by: STUDENT IN AN ORGANIZED HEALTH CARE EDUCATION/TRAINING PROGRAM

## 2022-12-09 PROCEDURE — 13000001 HB PHASE II RECOVERY EA 30 MINUTES

## 2022-12-09 PROCEDURE — 10002800 HB RX 250 W HCPCS: Performed by: STUDENT IN AN ORGANIZED HEALTH CARE EDUCATION/TRAINING PROGRAM

## 2022-12-09 PROCEDURE — 10002807 HB RX 258

## 2022-12-09 PROCEDURE — 13001456 HB PERINATAL CARE

## 2022-12-09 PROCEDURE — 10002801 HB RX 250 W/O HCPCS

## 2022-12-09 PROCEDURE — 0KQM0ZZ REPAIR PERINEUM MUSCLE, OPEN APPROACH: ICD-10-PCS | Performed by: OBSTETRICS & GYNECOLOGY

## 2022-12-09 RX ORDER — BUPIVACAINE HYDROCHLORIDE AND EPINEPHRINE 5; 5 MG/ML; UG/ML
INJECTION, SOLUTION EPIDURAL; INTRACAUDAL; PERINEURAL PRN
Status: DISCONTINUED | OUTPATIENT
Start: 2022-12-09 | End: 2022-12-09

## 2022-12-09 RX ORDER — SIMETHICONE 125 MG
125 TABLET,CHEWABLE ORAL EVERY 4 HOURS PRN
Status: DISCONTINUED | OUTPATIENT
Start: 2022-12-09 | End: 2022-12-10 | Stop reason: HOSPADM

## 2022-12-09 RX ORDER — ACETAMINOPHEN 325 MG/1
650 TABLET ORAL EVERY 6 HOURS
Status: DISCONTINUED | OUTPATIENT
Start: 2022-12-09 | End: 2022-12-10 | Stop reason: HOSPADM

## 2022-12-09 RX ORDER — HYDROCORTISONE ACETATE 25 MG/1
25 SUPPOSITORY RECTAL EVERY 8 HOURS PRN
Status: DISCONTINUED | OUTPATIENT
Start: 2022-12-09 | End: 2022-12-10 | Stop reason: HOSPADM

## 2022-12-09 RX ORDER — IBUPROFEN 600 MG/1
600 TABLET ORAL EVERY 6 HOURS
Status: DISCONTINUED | OUTPATIENT
Start: 2022-12-09 | End: 2022-12-10 | Stop reason: HOSPADM

## 2022-12-09 RX ORDER — OXYTOCIN 10 [USP'U]/ML
10 INJECTION, SOLUTION INTRAMUSCULAR; INTRAVENOUS
Status: DISCONTINUED | OUTPATIENT
Start: 2022-12-09 | End: 2022-12-10 | Stop reason: HOSPADM

## 2022-12-09 RX ORDER — VITAMIN A, VITAMIN C, VITAMIN D-3, VITAMIN E, VITAMIN B-1, VITAMIN B-2, NIACIN, VITAMIN B-6, CALCIUM, IRON, ZINC, COPPER 4000; 120; 400; 22; 1.84; 3; 20; 10; 1; 12; 200; 27; 25; 2 [IU]/1; MG/1; [IU]/1; MG/1; MG/1; MG/1; MG/1; MG/1; MG/1; UG/1; MG/1; MG/1; MG/1; MG/1
1 TABLET ORAL DAILY
Status: DISCONTINUED | OUTPATIENT
Start: 2022-12-09 | End: 2022-12-10 | Stop reason: HOSPADM

## 2022-12-09 RX ORDER — CALCIUM CARBONATE 500 MG/1
500 TABLET, CHEWABLE ORAL EVERY 4 HOURS PRN
Status: DISCONTINUED | OUTPATIENT
Start: 2022-12-09 | End: 2022-12-10 | Stop reason: HOSPADM

## 2022-12-09 RX ORDER — OXYTOCIN-SODIUM CHLORIDE 0.9% IV SOLN 30 UNIT/500ML 30-0.9/5 UT/ML-%
0-334 SOLUTION INTRAVENOUS CONTINUOUS
Status: DISCONTINUED | OUTPATIENT
Start: 2022-12-09 | End: 2022-12-10 | Stop reason: HOSPADM

## 2022-12-09 RX ADMIN — IBUPROFEN 600 MG: 600 TABLET, FILM COATED ORAL at 17:39

## 2022-12-09 RX ADMIN — IBUPROFEN 600 MG: 600 TABLET, FILM COATED ORAL at 11:54

## 2022-12-09 RX ADMIN — OXYTOCIN-SODIUM CHLORIDE 0.9% IV SOLN 30 UNIT/500ML 999 ML/HR: 30-0.9/5 SOLUTION at 09:52

## 2022-12-09 RX ADMIN — SODIUM CHLORIDE, POTASSIUM CHLORIDE, SODIUM LACTATE AND CALCIUM CHLORIDE: 600; 310; 30; 20 INJECTION, SOLUTION INTRAVENOUS at 08:50

## 2022-12-09 RX ADMIN — SODIUM CHLORIDE, POTASSIUM CHLORIDE, SODIUM LACTATE AND CALCIUM CHLORIDE: 600; 310; 30; 20 INJECTION, SOLUTION INTRAVENOUS at 01:06

## 2022-12-09 RX ADMIN — Medication 13 ML/HR: at 09:50

## 2022-12-09 RX ADMIN — ACETAMINOPHEN 650 MG: 325 TABLET ORAL at 20:32

## 2022-12-09 RX ADMIN — BUPIVACAINE HYDROCHLORIDE AND EPINEPHRINE 6 ML: 5; 5 INJECTION, SOLUTION EPIDURAL; INTRACAUDAL; PERINEURAL at 04:15

## 2022-12-09 RX ADMIN — OXYTOCIN-SODIUM CHLORIDE 0.9% IV SOLN 30 UNIT/500ML 95 ML/HR: 30-0.9/5 SOLUTION at 11:54

## 2022-12-09 ASSESSMENT — PAIN SCALES - GENERAL
PAINLEVEL_OUTOF10: 4
PAINLEVEL_OUTOF10: 2
PAINLEVEL_OUTOF10: 3
PAINLEVEL_OUTOF10: 0
PAINLEVEL_OUTOF10: 3

## 2022-12-09 ASSESSMENT — PATIENT HEALTH QUESTIONNAIRE - PHQ9: IS PATIENT ABLE TO COMPLETE PHQ2 OR PHQ9: NO, DEFER TO LATER TIME

## 2022-12-10 ENCOUNTER — TELEPHONE (OUTPATIENT)
Dept: OBGYN | Age: 37
End: 2022-12-10

## 2022-12-10 VITALS
HEIGHT: 65 IN | SYSTOLIC BLOOD PRESSURE: 109 MMHG | OXYGEN SATURATION: 100 % | BODY MASS INDEX: 30.6 KG/M2 | DIASTOLIC BLOOD PRESSURE: 74 MMHG | WEIGHT: 183.7 LBS | TEMPERATURE: 97.9 F | RESPIRATION RATE: 16 BRPM | HEART RATE: 87 BPM

## 2022-12-10 DIAGNOSIS — O92.70 UNSPECIFIED DISORDERS OF LACTATION: Primary | ICD-10-CM

## 2022-12-10 LAB
BASOPHILS # BLD: 0.1 K/MCL (ref 0–0.3)
BASOPHILS NFR BLD: 0 %
DEPRECATED RDW RBC: 51.3 FL (ref 39–50)
EOSINOPHIL # BLD: 0.2 K/MCL (ref 0–0.5)
EOSINOPHIL NFR BLD: 1 %
ERYTHROCYTE [DISTWIDTH] IN BLOOD: 14.1 % (ref 11–15)
HCT VFR BLD CALC: 32.9 % (ref 36–46.5)
HGB BLD-MCNC: 10.8 G/DL (ref 12–15.5)
IMM GRANULOCYTES # BLD AUTO: 0.1 K/MCL (ref 0–0.2)
IMM GRANULOCYTES # BLD: 1 %
LYMPHOCYTES # BLD: 1.9 K/MCL (ref 1–4.8)
LYMPHOCYTES NFR BLD: 13 %
MCH RBC QN AUTO: 32.6 PG (ref 26–34)
MCHC RBC AUTO-ENTMCNC: 32.8 G/DL (ref 32–36.5)
MCV RBC AUTO: 99.4 FL (ref 78–100)
MONOCYTES # BLD: 0.6 K/MCL (ref 0.3–0.9)
MONOCYTES NFR BLD: 4 %
NEUTROPHILS # BLD: 12 K/MCL (ref 1.8–7.7)
NEUTROPHILS NFR BLD: 81 %
NRBC BLD MANUAL-RTO: 0 /100 WBC
PLATELET # BLD AUTO: 135 K/MCL (ref 140–450)
RBC # BLD: 3.31 MIL/MCL (ref 4–5.2)
WBC # BLD: 14.8 K/MCL (ref 4.2–11)

## 2022-12-10 PROCEDURE — 10002803 HB RX 637: Performed by: STUDENT IN AN ORGANIZED HEALTH CARE EDUCATION/TRAINING PROGRAM

## 2022-12-10 PROCEDURE — 10004651 HB RX, NO CHARGE ITEM: Performed by: STUDENT IN AN ORGANIZED HEALTH CARE EDUCATION/TRAINING PROGRAM

## 2022-12-10 PROCEDURE — 85025 COMPLETE CBC W/AUTO DIFF WBC: CPT | Performed by: STUDENT IN AN ORGANIZED HEALTH CARE EDUCATION/TRAINING PROGRAM

## 2022-12-10 RX ORDER — ACETAMINOPHEN 325 MG/1
650 TABLET ORAL EVERY 4 HOURS PRN
Qty: 42 TABLET | Refills: 0 | Status: SHIPPED | OUTPATIENT
Start: 2022-12-10

## 2022-12-10 RX ORDER — IBUPROFEN 600 MG/1
600 TABLET ORAL EVERY 6 HOURS PRN
Qty: 42 TABLET | Refills: 0 | Status: SHIPPED | OUTPATIENT
Start: 2022-12-10

## 2022-12-10 RX ADMIN — ACETAMINOPHEN 650 MG: 325 TABLET ORAL at 02:43

## 2022-12-10 RX ADMIN — IBUPROFEN 600 MG: 600 TABLET, FILM COATED ORAL at 06:48

## 2022-12-10 ASSESSMENT — PAIN SCALES - GENERAL
PAINLEVEL_OUTOF10: 2

## 2024-01-25 NOTE — PROGRESS NOTES
Wellness Exam    CC: Patient is presenting for a wellness exam and to establish care.  Moved from [facility] 4m ago.   HPI:   Concerns: none. Normal state of health.     Pertinent Family History:   Family History   Problem Relation Age of Onset    Hypertension Father     No Known Problems Mother     No Known Problems Son         Gyne:     Health Maintenance   Topic Date Due    Pap Smear  01/01/2026            Denies pelvic pain, abnormal discharge or genital lesions.  Regular menses, regular flow.     Last mammogram:  No recommendations at this time   Regular self breast exam: discussed.    Bone Health: Last DEXA: N/A  Osteoporosis prevention: weight resistant exercises, check vitamin D  Last colonoscopy:  No recommendations at this time     Reported Health: good.  Diet is regular, exercise: intermittent.    History reviewed. No pertinent past medical history.  History reviewed. No pertinent surgical history.  Social History     Socioeconomic History    Marital status:    Tobacco Use    Smoking status: Never    Smokeless tobacco: Never   Vaping Use    Vaping Use: Never used   Substance and Sexual Activity    Alcohol use: Never    Drug use: Never     No current outpatient medications on file prior to visit.     No current facility-administered medications on file prior to visit.       Review of Systems   Review of Systems   Constitutional: Negative for fever, chills and fatigue.   HENT: Negative for hearing loss, congestion, sore throat and neck pain.    Eyes: Negative for pain and visual disturbance.   Respiratory: Negative for cough and shortness of breath.    Cardiovascular: Negative for chest pain and palpitations.   Gastrointestinal: Negative for nausea, vomiting, abdominal pain and diarrhea.   Genitourinary: Negative for urgency, frequency of urination, and abnormal vaginal bleeding.   Musculoskeletal: Negative for arthralgias and gait problem.   Skin: Negative for color change and rash.   Neurological:  Negative for tremors, weakness and numbness.   Hematological: Negative for adenopathy. Does not bruise/bleed easily.   Psychiatric/Behavioral: Negative for confusion and agitation. The patient is not nervous/anxious.      /70 (BP Location: Right arm, Patient Position: Sitting, Cuff Size: adult)   Pulse 70   Temp 98.1 °F (36.7 °C) (Oral)   Ht 5' 6\" (1.676 m)   Wt 180 lb (81.6 kg)   SpO2 98%   BMI 29.05 kg/m²   Physical Exam  Physical Exam    Constitutional: She is oriented to person, place, and time. She appears well-developed. No distress.    Head: Normocephalic and atraumatic.   Eyes: EOM are normal. Pupils are equal, round, and reactive to light. No scleral icterus. Fundoscopic exam: No hemorrhages, A/V nicking, exudates or papilledema.  ENT: TM's clear, nose normal, no oropharyngeal exudates or tonsillar hypertrophy    Neck: Normal range of motion. No thyromegaly present.   Cardiovascular: Normal rate, regular rhythm and normal heart sounds.  No murmur or friction rub heard.  Pulmonary/Chest: Effort normal and breath sounds normal bilaterally. She has no wheezes or rales.   Breasts: deferred  Abdominal: Soft. Bowel sounds are normal. There is no tenderness. No HSM.  Musculoskeletal: Normal range of motion. She exhibits no edema.   Lymphadenopathy: She has no cervical, supraclavicular, or axillary adenopathy.   : deferred  Neurological: She is alert and oriented to person, place, and time. DTRs are +2 and symmetric. Cranial nerves grossly intact.  Skin: Skin is warm. No rash noted. No erythema, pallor or jaundice.   Psychiatric: She has a normal mood and affect and her behavior is normal.     Assessment and Plan:  Omar Cheema is a 38 year old female here for a wellness exam.    Diagnoses and all orders for this visit:    Annual physical exam    Laboratory exam ordered as part of routine general medical examination  -     CBC With Differential With Platelet; Future  -     Comp Metabolic Panel  (14); Future  -     Lipid Panel; Future  -     TSH W Reflex To Free T4; Future  -     Urinalysis, Routine; Future    Encounter for vitamin deficiency screening  -     Vitamin D; Future       Age appropriate cancer screening, labs, safety, immunizations were discussed with the patient and ordered as follows:    Health Maintenance Due   Topic Date Due    Annual Physical  Never done    COVID-19 Vaccine (1) Never done    DTaP,Tdap,and Td Vaccines (1 - Tdap) Never done    Pap Smear  Never done    Influenza Vaccine (1) Never done    Annual Depression Screening  Never done      Orders Placed This Encounter   Procedures    CBC With Differential With Platelet     Standing Status:   Future     Standing Expiration Date:   1/25/2025    Comp Metabolic Panel (14)     Standing Status:   Future     Standing Expiration Date:   1/20/2025    Lipid Panel     Standing Status:   Future     Standing Expiration Date:   1/20/2025    TSH W Reflex To Free T4     Standing Status:   Future     Standing Expiration Date:   1/20/2025    Vitamin D     Standing Status:   Future     Standing Expiration Date:   1/26/2025     Order Specific Question:   Please pick the scenario that best fits the purpose for ordering this test     Answer:   General Screening/Vit D deficiency (25-Hydroxy)     Order Specific Question:   Release to patient     Answer:   Immediate    Urinalysis, Routine     Standing Status:   Future     Standing Expiration Date:   1/26/2025     Discussed use of sunscreen, wearing seatbelt, recommend regular cardiovascular and weight bearing exercise as well as a well-rounded diet.    Her 5 year prevention plan includes: annual physical and labs, 30 minutes exercise most days of week and heart healthy diet  Patient/Caregiver Education:  Patient/Caregiver Education: There are no barriers to learning. Medical education done.  Outcome: Patient verbalizes understanding.       Return in 12m for annual physical.  Educated by: OSIEL Mullen

## 2024-01-26 ENCOUNTER — OFFICE VISIT (OUTPATIENT)
Dept: INTERNAL MEDICINE CLINIC | Facility: CLINIC | Age: 39
End: 2024-01-26
Payer: COMMERCIAL

## 2024-01-26 VITALS
SYSTOLIC BLOOD PRESSURE: 128 MMHG | WEIGHT: 180 LBS | DIASTOLIC BLOOD PRESSURE: 70 MMHG | HEART RATE: 70 BPM | HEIGHT: 66 IN | OXYGEN SATURATION: 98 % | BODY MASS INDEX: 28.93 KG/M2 | TEMPERATURE: 98 F

## 2024-01-26 DIAGNOSIS — Z00.00 ANNUAL PHYSICAL EXAM: Primary | ICD-10-CM

## 2024-01-26 DIAGNOSIS — Z00.00 LABORATORY EXAM ORDERED AS PART OF ROUTINE GENERAL MEDICAL EXAMINATION: ICD-10-CM

## 2024-01-26 DIAGNOSIS — Z13.21 ENCOUNTER FOR VITAMIN DEFICIENCY SCREENING: ICD-10-CM

## 2024-01-26 PROCEDURE — 3078F DIAST BP <80 MM HG: CPT

## 2024-01-26 PROCEDURE — 3074F SYST BP LT 130 MM HG: CPT

## 2024-01-26 PROCEDURE — 3008F BODY MASS INDEX DOCD: CPT

## 2024-01-26 PROCEDURE — 99395 PREV VISIT EST AGE 18-39: CPT
